# Patient Record
Sex: MALE | Race: WHITE | URBAN - METROPOLITAN AREA
[De-identification: names, ages, dates, MRNs, and addresses within clinical notes are randomized per-mention and may not be internally consistent; named-entity substitution may affect disease eponyms.]

---

## 2023-01-04 NOTE — PROGRESS NOTES
Jaclyn Martínez is a 27 y.o. male is seen on 1/5/2023 for Establish Care (Medications for ADD. )    Assessment & Plan:     1. Attention deficit disorder, unspecified hyperactivity presence  Assessment & Plan:  UDS, CSA done today  Understands that blood pressure and pulse rate will need to be within normal limits prior to restart of stimulant  Will await UDS results  Orders:  -     COMPLIANCE DRUG SCREEN/PRESCRIPTION MONITORING; Future  2. Encounter for therapeutic drug monitoring  -     COMPLIANCE DRUG SCREEN/PRESCRIPTION MONITORING; Future  3. Elevated blood pressure reading without diagnosis of hypertension  Assessment & Plan:  Likely secondary to current Prednisone use, re-evaluate in 4 weeks  4. Tachycardia  Comments:  Asymptomatic, likely d/t current Prednisone use, re-evaluate in 4 weeks  5. Elevated LFTs  Assessment & Plan:  New-onset, advised on ETOH/Tylenol use  Recheck LFTs with viral hepatitis panel in 3 weeks  Pursue liver US if worse  Orders:  -     HEPATIC FUNCTION PANEL; Future  -     HEPATITIS PANEL, ACUTE; Future  6. Needs flu shot  Comments: Will hold off until done with Prednisone  7. Encounter to establish care  Comments:  Non-fasting labs completed on 12/27/2022 reviewed and scanned into chart  Completed Annual Wellness Exam previously on 12/30/2022    Follow-up and Dispositions    Return in about 4 weeks (around 2/2/2023) for physical, lab results (hepatic function panel, viral hepatitis panel).        Subjective:     HPI    Previous PCP:  Whitley Dawn  Reason for switching: PCP retired    Social Hx:  Occupation- [employer]  Household- lives with wife and daughter (10 mos)  Alcohol Use- 2 times a month  Recreational Drug Use- none  Tobacco Use- none    Family Hx:  HTN- mother  Diabetes-  none  HLD- none  MI- none  Stroke- none  Cancer- none  Mental Health Disorder- mother (ADHD)  Autoimmune Disease - none    Preventive:  COVID-19 vac - due for booster  Flu vaccine- will hold off until off of steroids  Tetanus vac - received a little over 9 mos ago when his daughter was born  MyChart activation -    Medical History/Health Concerns:    ADHD -  Diagnosed in his early 25s  Was on Adderall XR 10 mg daily, last dose was 4-5 years ago  Adderall worked for him    Elevated BP -  No prior hx of HTN  Currently taking Prednisone for bronchitis  Admits that he is at his heaviest and wants to go back to the gym    Review of Systems   Constitutional:  Negative for chills, fever and malaise/fatigue. Respiratory:  Negative for shortness of breath. Cardiovascular:  Negative for chest pain. Objective:   BP (!) 144/108 (BP 1 Location: Left upper arm, BP Patient Position: Sitting, BP Cuff Size: Large adult)   Pulse (!) 116   Temp 97.7 °F (36.5 °C) (Oral)   Resp 17   Ht 6' 2\" (1.88 m)   Wt 286 lb (129.7 kg)   SpO2 97%   BMI 36.72 kg/m²     Physical Exam  Vitals and nursing note reviewed. Constitutional:       Appearance: Normal appearance. HENT:      Head: Normocephalic and atraumatic. Cardiovascular:      Rate and Rhythm: Regular rhythm. Tachycardia present. Heart sounds: No murmur heard. No gallop. Pulmonary:      Effort: Pulmonary effort is normal. No respiratory distress. Breath sounds: No wheezing, rhonchi or rales. Musculoskeletal:         General: Normal range of motion. Skin:     General: Skin is warm and dry. Neurological:      General: No focal deficit present. Mental Status: He is alert and oriented to person, place, and time. Psychiatric:         Mood and Affect: Mood normal.         Thought Content:  Thought content normal.         Judgment: Judgment normal.          Kanika Chopra NP

## 2023-01-05 ENCOUNTER — HOSPITAL ENCOUNTER (OUTPATIENT)
Dept: LAB | Age: 33
Discharge: HOME | End: 2023-01-05
Payer: COMMERCIAL

## 2023-01-05 ENCOUNTER — OFFICE VISIT (OUTPATIENT)
Dept: FAMILY MEDICINE CLINIC | Age: 33
End: 2023-01-05
Payer: COMMERCIAL

## 2023-01-05 VITALS
DIASTOLIC BLOOD PRESSURE: 108 MMHG | OXYGEN SATURATION: 97 % | BODY MASS INDEX: 36.7 KG/M2 | HEIGHT: 74 IN | RESPIRATION RATE: 17 BRPM | WEIGHT: 286 LBS | SYSTOLIC BLOOD PRESSURE: 144 MMHG | HEART RATE: 116 BPM | TEMPERATURE: 97.7 F

## 2023-01-05 DIAGNOSIS — R03.0 ELEVATED BLOOD PRESSURE READING WITHOUT DIAGNOSIS OF HYPERTENSION: ICD-10-CM

## 2023-01-05 DIAGNOSIS — F98.8 ATTENTION DEFICIT DISORDER, UNSPECIFIED HYPERACTIVITY PRESENCE: ICD-10-CM

## 2023-01-05 DIAGNOSIS — R00.0 TACHYCARDIA: ICD-10-CM

## 2023-01-05 DIAGNOSIS — Z76.89 ENCOUNTER TO ESTABLISH CARE: ICD-10-CM

## 2023-01-05 DIAGNOSIS — Z51.81 ENCOUNTER FOR THERAPEUTIC DRUG MONITORING: ICD-10-CM

## 2023-01-05 DIAGNOSIS — R79.89 ELEVATED LFTS: ICD-10-CM

## 2023-01-05 DIAGNOSIS — F98.8 ATTENTION DEFICIT DISORDER, UNSPECIFIED HYPERACTIVITY PRESENCE: Primary | ICD-10-CM

## 2023-01-05 DIAGNOSIS — Z23 NEEDS FLU SHOT: ICD-10-CM

## 2023-01-05 PROCEDURE — 80307 DRUG TEST PRSMV CHEM ANLYZR: CPT

## 2023-01-05 PROCEDURE — 99204 OFFICE O/P NEW MOD 45 MIN: CPT | Performed by: NURSE PRACTITIONER

## 2023-01-05 NOTE — PROGRESS NOTES
Dylan Velez presents today for   Chief Complaint   Patient presents with    Establish Care     Medications for ADD. Is someone accompanying this pt? no    Is the patient using any DME equipment during [address]? no    Depression Screening:  3 most recent PHQ Screens 1/5/2023   Little interest or pleasure in doing things Not at all   Feeling down, depressed, irritable, or hopeless Not at all   Total Score PHQ 2 0       Learning Assessment:  Learning Assessment 1/5/2023   PRIMARY LEARNER Patient   HIGHEST LEVEL OF EDUCATION - PRIMARY LEARNER  SOME COLLEGE   BARRIERS PRIMARY LEARNER NONE   CO-LEARNER CAREGIVER No   PRIMARY LANGUAGE ENGLISH    NEED -   LEARNER PREFERENCE PRIMARY DEMONSTRATION   ANSWERED BY patient   RELATIONSHIP SELF       Fall Risk  No flowsheet data found. ADL  ADL Assessment 8/4/2016   Feeding yourself No Help Needed   Getting from bed to chair No Help Needed   Getting dressed No Help Needed   Bathing or showering No Help Needed   Walk across the room (includes cane/walker) No Help Needed   Using the telphone No Help Needed   Taking your medications No Help Needed   Preparing meals No Help Needed   Managing money (expenses/bills) No Help Needed   Moderately strenuous housework (laundry) No Help Needed   Shopping for personal items (toiletries/medicines) No Help Needed   Shopping for groceries No Help Needed   Driving No Help Needed   Climbing a flight of stairs No Help Needed   Getting to places beyond walking distances No Help Needed       Travel Screening:    Travel Screening       Question Response    In the last 10 days, have you been in contact with someone who was confirmed or suspected to have Coronavirus/COVID-19? No / Unsure    Have you had a COVID-19 viral test in the last 10 days? No    Do you have any of the following new or worsening symptoms? None of these    Have you traveled internationally or domestically in the last month?  No          Travel History   Travel since 12/05/22    No documented travel since 12/05/22         Health Maintenance reviewed and discussed and ordered per Provider. Health Maintenance Due   Topic Date Due    Hepatitis C Screening  Never done    Depression Screen  Never done    DTaP/Tdap/Td series (6 - Tdap) 04/13/1999    COVID-19 Vaccine (3 - Booster for Pfizer series) 07/16/2021    Flu Vaccine (1) Never done   . Coordination of Care:  1. Have you been to the ER, urgent care clinic since your last visit? Hospitalized since your last visit? no    2. Have you seen or consulted any other health care providers outside of the [address] [place] [address] since your last visit? Include any pap smears or colon screening.  no

## 2023-01-05 NOTE — ASSESSMENT & PLAN NOTE
New-onset, advised on ETOH/Tylenol use  Recheck LFTs with viral hepatitis panel in 3 weeks  Pursue liver US if worse

## 2023-01-05 NOTE — ASSESSMENT & PLAN NOTE
UDS, CSA done today  Understands that blood pressure and pulse rate will need to be within normal limits prior to restart of stimulant  Will await UDS results

## 2023-01-11 LAB — DRUGS UR: NORMAL

## 2023-02-06 PROBLEM — Z00.00 ANNUAL PHYSICAL EXAM: Status: ACTIVE | Noted: 2023-02-06

## 2023-02-06 NOTE — PROGRESS NOTES
Angelina Barrera is a 27 y.o. male is seen on 2/8/2023 for Attention Deficit Disorder and Physical    Assessment & Plan:     1. Annual physical exam  Assessment & Plan:  Physical activity for a total of 150 minutes per week is recommended, drink plenty of water. Reduce CHO intake, such as white pastas, white rice, white breads. Avoid fried foods, and eat more green, leafy vegetables, whole grains, and lean proteins. Discussed recommended screenings and vaccines. Labs were already done previously (see media)    2. Essential hypertension  Assessment & Plan:  Remains uncontrolled, goal <130/80  Start Losartan 25 mg daily  Advised on home BP monitoring, given log to bring back to next appt  If BP remains elevated, increase Losartan to 50 mg   Orders:  -     losartan (COZAAR) 25 mg tablet; Take 1 Tablet by mouth daily. , Normal, Disp-90 Tablet, R-0  3. Attention deficit disorder, unspecified hyperactivity presence  Assessment & Plan:  Non a candidate for stimulant pharmacologic treatment, refer to psychiatry for further eval  Orders:  -     REFERRAL TO PSYCHIATRY  4. Tachycardia  Comments:  Check EKG, may consider checking TSH if persists  Orders:  -     EKG, 12 LEAD, INITIAL; Future  5. Needs flu shot  Comments:  Wants to hold off for next time  6. Morbid obesity ([facility] [address]  Assessment & Plan:  Comorbid:  HTN  Lifestyle modifications advised  7. BMI 36.0-36.9,adult  Comments:  Diet and exercise advised    Follow-up and Dispositions    Return in about 4 weeks (around 3/8/2023) for blood pressure, lab results.        Subjective:     HPI    Social Hx:  Occupation- [employer]  Household- lives with wife and daughter (10 mos)  Alcohol Use- 2 times a month  Recreational Drug Use- none  Tobacco Use- none    Family Hx:  HTN- mother  Diabetes-  none  HLD- none  MI- none  Stroke- none  Cancer- none  Mental Health Disorder- mother (ADHD)  Autoimmune Disease - none    Preventive:  Diet: cereal in the mornings, Hello Fresh for dinner  Exercise:  None  Last eye exam: last year  Last dental exam: 2 years ago  COVID-19 vac - due for booster  Flu vaccine- wants to get next time  Tetanus vac - received a little over 9 mos ago when his daughter was born    Medical History/Health Concerns:    ADHD -  Diagnosed in his early 25s  Was on Adderall XR 10 mg daily, last dose was 4-5 years ago  Adderall worked for him  Last UDS done on 01/05/2023, negative    Elevated BP -  No prior hx of HTN  Currently taking Prednisone for bronchitis  Admits that he is at his heaviest and wants to go back to the gym  Reports family hx of HTN in mother    Review of Systems   Constitutional:  Negative for chills, fever, malaise/fatigue and weight loss. HENT:  Negative for congestion, ear pain and sore throat. Eyes:  Negative for blurred vision, pain and discharge. Respiratory:  Negative for cough and shortness of breath. Cardiovascular:  Negative for chest pain, palpitations, orthopnea and leg swelling. Gastrointestinal:  Negative for abdominal pain, blood in stool, diarrhea, nausea and vomiting. Genitourinary:  Negative for dysuria, frequency, hematuria and urgency. Musculoskeletal:  Negative for back pain, joint pain and myalgias. Skin:  Negative for rash. Neurological:  Negative for dizziness, tingling, weakness and headaches. Endo/Heme/Allergies:  Does not bruise/bleed easily. Psychiatric/Behavioral:  Negative for depression. The patient is not nervous/anxious and does not have insomnia. Objective:   BP (!) 161/109 (BP 1 Location: Left upper arm, BP Patient Position: Sitting, BP Cuff Size: Adult)   Pulse (!) 115   Temp 98.3 °F (36.8 °C) (Oral)   Resp 16   Ht 6' 2\" (1.88 m)   Wt 282 lb (127.9 kg)   SpO2 99%   BMI 36.21 kg/m²     Physical Exam  Nursing note reviewed. Constitutional:       General: He is not in acute distress. Appearance: He is not ill-appearing. HENT:      Head: Normocephalic and atraumatic.       Right Ear: Tympanic membrane, ear canal and external ear normal.      Left Ear: Tympanic membrane, ear canal and external ear normal.      Nose: No congestion or rhinorrhea. Mouth/Throat:      Mouth: Mucous membranes are moist.      Pharynx: Oropharynx is clear. No oropharyngeal exudate or posterior oropharyngeal erythema. Eyes:      Extraocular Movements: Extraocular movements intact. Conjunctiva/sclera: Conjunctivae normal.      Pupils: Pupils are equal, round, and reactive to light. Neck:      Vascular: No carotid bruit. Cardiovascular:      Rate and Rhythm: Regular rhythm. Tachycardia present. Pulses: Normal pulses. Heart sounds: No murmur heard. No friction rub. No gallop. Pulmonary:      Effort: Pulmonary effort is normal. No respiratory distress. Breath sounds: No wheezing, rhonchi or rales. Abdominal:      General: Abdomen is flat. Bowel sounds are normal.      Palpations: Abdomen is soft. There is no mass. Tenderness: There is no abdominal tenderness. There is no guarding or rebound. Musculoskeletal:         General: No swelling, tenderness or deformity. Cervical back: Normal range of motion and neck supple. Right lower leg: No edema. Left lower leg: No edema. Lymphadenopathy:      Cervical: No cervical adenopathy. Skin:     General: Skin is warm and dry. Capillary Refill: Capillary refill takes less than 2 seconds. Neurological:      General: No focal deficit present. Mental Status: He is alert and oriented to person, place, and time. Sensory: No sensory deficit. Motor: No weakness. Psychiatric:         Mood and Affect: Mood normal.         Thought Content:  Thought content normal.         Judgment: Judgment normal.          Corinne Pitt NP

## 2023-02-06 NOTE — ASSESSMENT & PLAN NOTE
Physical activity for a total of 150 minutes per week is recommended, drink plenty of water. Reduce CHO intake, such as white pastas, white rice, white breads. Avoid fried foods, and eat more green, leafy vegetables, whole grains, and lean proteins. Discussed recommended screenings and vaccines.  Labs were already done previously (see media)

## 2023-02-08 ENCOUNTER — OFFICE VISIT (OUTPATIENT)
Dept: FAMILY MEDICINE CLINIC | Age: 33
End: 2023-02-08
Payer: COMMERCIAL

## 2023-02-08 VITALS
TEMPERATURE: 98.3 F | OXYGEN SATURATION: 99 % | WEIGHT: 282 LBS | RESPIRATION RATE: 16 BRPM | HEART RATE: 115 BPM | DIASTOLIC BLOOD PRESSURE: 109 MMHG | BODY MASS INDEX: 36.19 KG/M2 | SYSTOLIC BLOOD PRESSURE: 161 MMHG | HEIGHT: 74 IN

## 2023-02-08 DIAGNOSIS — R00.0 TACHYCARDIA: ICD-10-CM

## 2023-02-08 DIAGNOSIS — I10 ESSENTIAL HYPERTENSION: ICD-10-CM

## 2023-02-08 DIAGNOSIS — E66.01 MORBID OBESITY (HCC): ICD-10-CM

## 2023-02-08 DIAGNOSIS — F98.8 ATTENTION DEFICIT DISORDER, UNSPECIFIED HYPERACTIVITY PRESENCE: ICD-10-CM

## 2023-02-08 DIAGNOSIS — Z23 NEEDS FLU SHOT: ICD-10-CM

## 2023-02-08 DIAGNOSIS — Z00.00 ANNUAL PHYSICAL EXAM: Primary | ICD-10-CM

## 2023-02-08 PROBLEM — Z87.442 HISTORY OF KIDNEY STONES: Status: ACTIVE | Noted: 2023-02-08

## 2023-02-08 LAB
ALBUMIN SERPL-MCNC: 4.8 G/DL (ref 4–5)
ALP SERPL-CCNC: 113 IU/L (ref 44–121)
ALT SERPL-CCNC: 42 IU/L (ref 0–44)
AST SERPL-CCNC: 24 IU/L (ref 0–40)
BILIRUB DIRECT SERPL-MCNC: 0.13 MG/DL (ref 0–0.4)
BILIRUB SERPL-MCNC: 0.4 MG/DL (ref 0–1.2)
HAV IGM SERPL QL IA: NEGATIVE
HBV CORE IGM SERPL QL IA: NEGATIVE
HBV SURFACE AG SERPL QL IA: NORMAL
HCV AB S/CO SERPL IA: <0.1 S/CO RATIO (ref 0–0.9)
HCV AB SERPL QL IA: NORMAL
PROT SERPL-MCNC: 7.1 G/DL (ref 6–8.5)

## 2023-02-08 PROCEDURE — 99395 PREV VISIT EST AGE 18-39: CPT | Performed by: NURSE PRACTITIONER

## 2023-02-08 PROCEDURE — 3077F SYST BP >= 140 MM HG: CPT | Performed by: NURSE PRACTITIONER

## 2023-02-08 PROCEDURE — 99214 OFFICE O/P EST MOD 30 MIN: CPT | Performed by: NURSE PRACTITIONER

## 2023-02-08 PROCEDURE — 3080F DIAST BP >= 90 MM HG: CPT | Performed by: NURSE PRACTITIONER

## 2023-02-08 RX ORDER — LOSARTAN POTASSIUM 25 MG/1
25 TABLET ORAL DAILY
Qty: 90 TABLET | Refills: 0 | Status: SHIPPED | OUTPATIENT
Start: 2023-02-08

## 2023-02-08 RX ORDER — AMLODIPINE BESYLATE 5 MG/1
5 TABLET ORAL DAILY
Qty: 90 TABLET | Refills: 0 | Status: CANCELLED | OUTPATIENT
Start: 2023-02-08

## 2023-02-08 NOTE — PROGRESS NOTES
Shaq Knutson presents today for   Chief Complaint   Patient presents with    Attention Deficit Disorder    Physical       Is someone accompanying this pt? no    Is the patient using any DME equipment during OV? no    Depression Screening:  3 most recent PHQ Screens 2/8/2023   Little interest or pleasure in doing things Not at all   Feeling down, depressed, irritable, or hopeless Not at all   Total Score PHQ 2 0       Learning Assessment:  Learning Assessment 1/5/2023   PRIMARY LEARNER Patient   HIGHEST LEVEL OF EDUCATION - PRIMARY LEARNER  SOME COLLEGE   BARRIERS PRIMARY LEARNER NONE   CO-LEARNER CAREGIVER No   PRIMARY LANGUAGE ENGLISH    NEED -   LEARNER PREFERENCE PRIMARY DEMONSTRATION   ANSWERED BY patient   RELATIONSHIP SELF       Fall Risk  No flowsheet data found. ADL  ADL Assessment 8/4/2016   Feeding yourself No Help Needed   Getting from bed to chair No Help Needed   Getting dressed No Help Needed   Bathing or showering No Help Needed   Walk across the room (includes cane/walker) No Help Needed   Using the telphone No Help Needed   Taking your medications No Help Needed   Preparing meals No Help Needed   Managing money (expenses/bills) No Help Needed   Moderately strenuous housework (laundry) No Help Needed   Shopping for personal items (toiletries/medicines) No Help Needed   Shopping for groceries No Help Needed   Driving No Help Needed   Climbing a flight of stairs No Help Needed   Getting to places beyond walking distances No Help Needed       Travel Screening:    Travel Screening       Question Response    In the last 10 days, have you been in contact with someone who was confirmed or suspected to have Coronavirus/COVID-19? No / Unsure    Have you had a COVID-19 viral test in the last 10 days? No    Do you have any of the following new or worsening symptoms? None of these    Have you traveled internationally or domestically in the last month?  No          Travel History   Travel since 01/08/23    No documented travel since 01/08/23         Health Maintenance reviewed and discussed and ordered per Provider. Health Maintenance Due   Topic Date Due    Hepatitis C Screening  Never done    DTaP/Tdap/Td series (6 - Tdap) 04/13/1999    COVID-19 Vaccine (3 - Booster for Pfizer series) 07/16/2021    Flu Vaccine (1) Never done   . Coordination of Care:  1. Have you been to the ER, urgent care clinic since your last visit? Hospitalized since your last visit? no    2. Have you seen or consulted any other health care providers outside of the [address] [place] [address] since your last visit? Include any pap smears or colon screening.  no

## 2023-02-08 NOTE — PATIENT INSTRUCTIONS
Well Visit, Ages 25 to 72: Care Instructions  Well visits can help you stay healthy. Your doctor has checked your overall health and may have suggested ways to take good care of yourself. Your doctor also may have recommended tests. You can help prevent illness with healthy eating, good sleep, vaccinations, regular exercise, and other steps. Get the tests that you and your doctor decide on. Depending on your age and risks, examples might include screening for diabetes; hepatitis C; HIV; and cervical, breast, lung, and colon cancer. Screening helps find diseases before any symptoms appear. Eat healthy foods. Choose fruits, vegetables, whole grains, lean protein, and low-fat dairy foods. Limit saturated fat and reduce salt. Limit alcohol. Men should have no more than 2 drinks a day. Women should have no more than 1. For some people, no alcohol is the best choice. Exercise. Get at least 30 minutes of exercise on most days of the week. Walking can be a good choice. Reach and stay at your healthy weight. This will lower your risk for many health problems. Take care of your mental health. Try to stay connected with friends, family, and community, and find ways to manage stress. If you're feeling depressed or hopeless, talk to someone. A counselor can help. If you don't have a counselor, talk to your doctor. Talk to your doctor if you think you may have a problem with alcohol or drug use. This includes prescription medicines and illegal drugs. Avoid tobacco and nicotine: Don't smoke, vape, or chew. If you need help quitting, talk to your doctor. Practice safer sex. Getting tested, using condoms or dental dams, and limiting sex partners can help prevent STIs. Use birth control if it's important to you to prevent pregnancy. Talk with your doctor about your choices and what might be best for you. Prevent problems where you can.  Protect your skin from too much sun, wash your hands, brush your teeth twice a day, and wear a seat belt in the car. Where can you learn more? Go to http://www.[employer].com/  Enter P072 in the search box to learn more about \"Well Visit, Ages 25 to 72: Care Instructions. \"  Current as of: March 9, 2022               Content Version: 13.4  © 8689-8161 [employer]. Care instructions adapted under license by [employer] (which disclaims liability or warranty for this information). If you have questions about a medical condition or this instruction, always ask your healthcare professional. [place] [address] any warranty or liability for your use of this information.

## 2023-02-08 NOTE — ASSESSMENT & PLAN NOTE
Remains uncontrolled, goal <130/80  Start Losartan 25 mg daily  Advised on home BP monitoring, given log to bring back to next appt  If BP remains elevated, increase Losartan to 50 mg

## 2023-03-08 ENCOUNTER — TELEPHONE (OUTPATIENT)
Age: 33
End: 2023-03-08

## 2023-03-08 PROBLEM — Z00.00 ANNUAL PHYSICAL EXAM: Status: RESOLVED | Noted: 2023-02-06 | Resolved: 2023-03-08

## 2023-03-08 NOTE — TELEPHONE ENCOUNTER
Patient was supposed to schedule a 4-week follow up for HTN and lab results. Please call patient back and schedule. Bring blood pressure monitor and log to next appointment. Thank you.

## 2023-05-18 PROBLEM — R73.01 IFG (IMPAIRED FASTING GLUCOSE): Status: ACTIVE | Noted: 2023-05-18

## 2023-05-18 PROBLEM — E78.5 DYSLIPIDEMIA, GOAL LDL BELOW 100: Status: ACTIVE | Noted: 2023-05-18

## 2023-05-18 PROBLEM — R79.89 ELEVATED LFTS: Status: RESOLVED | Noted: 2023-01-05 | Resolved: 2023-05-18

## 2023-05-18 RX ORDER — LOSARTAN POTASSIUM 25 MG/1
TABLET ORAL
Qty: 90 TABLET | OUTPATIENT
Start: 2023-05-18

## 2023-05-18 ASSESSMENT — ENCOUNTER SYMPTOMS: SHORTNESS OF BREATH: 0

## 2023-05-18 NOTE — PROGRESS NOTES
Chief Complaint   Patient presents with    Hypertension    Cholesterol Problem    Other     Impaired fasting glucose      Assessment & Plan:     1. Essential hypertension  Assessment & Plan:  BP acceptable, goal <130/80, continue regimen  Orders:  -     Comprehensive Metabolic Panel; Future  -     losartan (COZAAR) 25 MG tablet; Take 1 tablet by mouth daily, Disp-90 tablet, R-0Normal  2. Dyslipidemia, goal LDL below 100  Assessment & Plan:  Lifestyle modifications advised  Recheck lipid panel in 3 mos  Orders:  -     Comprehensive Metabolic Panel; Future  -     Lipid Panel; Future  3. Hyperglycemia  Assessment & Plan:  Add A1c to next set of labs in 3 mos  Orders:  -     Comprehensive Metabolic Panel; Future  -     Hemoglobin A1C; Future    Follow-up and Dispositions    Return in about 3 months (around 8/19/2023) for blood pressure, cholesterol, elevated fasting blood sugar, lab results. Subjective:     HPI    Hypertension-  Symptoms:  None  BP readings at home are 672O systolic  Comorbid: morbid obesity  Current treatment: Losartan 25 mg started in February  Failed to follow up in March  Has not completed fasting labs    Hyperlipidemia  Treatment:  None  Comorbid:    Diet: does Hello Fresh  Exercise: Has not had time     Hyperglycemia-  States his labs from December 2022 was not fasting  Glucose at the time ws 142  Risk factors: HTN, HLD  A1c was 5.6 in December 2022 (see media)    Health Maintenance:  COVID-19 vac - due for booster  Tetanus vac - received a little over 9 mos ago when his daughter was born  A1c - ordered    Review of Systems   Constitutional: Negative. Respiratory:  Negative for shortness of breath. Cardiovascular:  Negative for chest pain, palpitations and leg swelling. Neurological:  Negative for dizziness and headaches.      Objective:   /87 (Site: Left Upper Arm, Position: Sitting, Cuff Size: Medium Adult)   Pulse (!) 117   Temp 98.1 °F (36.7 °C) (Oral)   Resp 16   Ht 6'

## 2023-05-19 ENCOUNTER — OFFICE VISIT (OUTPATIENT)
Facility: CLINIC | Age: 33
End: 2023-05-19
Payer: COMMERCIAL

## 2023-05-19 VITALS
OXYGEN SATURATION: 98 % | HEART RATE: 117 BPM | BODY MASS INDEX: 37.22 KG/M2 | RESPIRATION RATE: 16 BRPM | SYSTOLIC BLOOD PRESSURE: 126 MMHG | HEIGHT: 74 IN | TEMPERATURE: 98.1 F | WEIGHT: 290 LBS | DIASTOLIC BLOOD PRESSURE: 87 MMHG

## 2023-05-19 DIAGNOSIS — I10 ESSENTIAL HYPERTENSION: Primary | ICD-10-CM

## 2023-05-19 DIAGNOSIS — R73.9 HYPERGLYCEMIA: ICD-10-CM

## 2023-05-19 DIAGNOSIS — E78.5 DYSLIPIDEMIA, GOAL LDL BELOW 100: ICD-10-CM

## 2023-05-19 PROCEDURE — 99214 OFFICE O/P EST MOD 30 MIN: CPT | Performed by: NURSE PRACTITIONER

## 2023-05-19 PROCEDURE — 3079F DIAST BP 80-89 MM HG: CPT | Performed by: NURSE PRACTITIONER

## 2023-05-19 PROCEDURE — 3074F SYST BP LT 130 MM HG: CPT | Performed by: NURSE PRACTITIONER

## 2023-05-19 RX ORDER — GUANFACINE 2 MG/1
TABLET, EXTENDED RELEASE ORAL
COMMUNITY
Start: 2023-05-11

## 2023-05-19 RX ORDER — LOSARTAN POTASSIUM 25 MG/1
25 TABLET ORAL DAILY
COMMUNITY
End: 2023-05-19 | Stop reason: SDUPTHER

## 2023-05-19 RX ORDER — LOSARTAN POTASSIUM 25 MG/1
25 TABLET ORAL DAILY
Qty: 90 TABLET | Refills: 0 | Status: SHIPPED | OUTPATIENT
Start: 2023-05-19

## 2023-05-19 SDOH — ECONOMIC STABILITY: HOUSING INSECURITY
IN THE LAST 12 MONTHS, WAS THERE A TIME WHEN YOU DID NOT HAVE A STEADY PLACE TO SLEEP OR SLEPT IN A SHELTER (INCLUDING NOW)?: NO

## 2023-05-19 SDOH — ECONOMIC STABILITY: FOOD INSECURITY: WITHIN THE PAST 12 MONTHS, THE FOOD YOU BOUGHT JUST DIDN'T LAST AND YOU DIDN'T HAVE MONEY TO GET MORE.: NEVER TRUE

## 2023-05-19 SDOH — ECONOMIC STABILITY: TRANSPORTATION INSECURITY
IN THE PAST 12 MONTHS, HAS LACK OF TRANSPORTATION KEPT YOU FROM MEETINGS, WORK, OR FROM GETTING THINGS NEEDED FOR DAILY LIVING?: NO

## 2023-05-19 SDOH — ECONOMIC STABILITY: INCOME INSECURITY: HOW HARD IS IT FOR YOU TO PAY FOR THE VERY BASICS LIKE FOOD, HOUSING, MEDICAL CARE, AND HEATING?: SOMEWHAT HARD

## 2023-05-19 SDOH — ECONOMIC STABILITY: FOOD INSECURITY: WITHIN THE PAST 12 MONTHS, YOU WORRIED THAT YOUR FOOD WOULD RUN OUT BEFORE YOU GOT MONEY TO BUY MORE.: NEVER TRUE

## 2023-05-19 ASSESSMENT — PATIENT HEALTH QUESTIONNAIRE - PHQ9
SUM OF ALL RESPONSES TO PHQ QUESTIONS 1-9: 0
1. LITTLE INTEREST OR PLEASURE IN DOING THINGS: 0
2. FEELING DOWN, DEPRESSED OR HOPELESS: 0
SUM OF ALL RESPONSES TO PHQ QUESTIONS 1-9: 0
SUM OF ALL RESPONSES TO PHQ9 QUESTIONS 1 & 2: 0

## 2023-08-14 DIAGNOSIS — I10 ESSENTIAL HYPERTENSION: ICD-10-CM

## 2023-08-15 RX ORDER — LOSARTAN POTASSIUM 25 MG/1
25 TABLET ORAL DAILY
Qty: 30 TABLET | Refills: 0 | Status: SHIPPED | OUTPATIENT
Start: 2023-08-15

## 2023-08-15 NOTE — TELEPHONE ENCOUNTER
Refill approved for 30 days. Please advise patient to complete fasting labs asap, prior to scheduled appointment on 08/18/2023. Thanks.

## 2023-09-12 DIAGNOSIS — I10 ESSENTIAL HYPERTENSION: ICD-10-CM

## 2023-09-12 RX ORDER — LOSARTAN POTASSIUM 25 MG/1
25 TABLET ORAL DAILY
Qty: 30 TABLET | Refills: 0 | Status: SHIPPED | OUTPATIENT
Start: 2023-09-12

## 2023-09-12 RX ORDER — LOSARTAN POTASSIUM 25 MG/1
25 TABLET ORAL DAILY
Qty: 30 TABLET | Refills: 0 | OUTPATIENT
Start: 2023-09-12

## 2023-09-12 NOTE — TELEPHONE ENCOUNTER
Spoke w/ pt in ref to labs still needing to be drawn and f/u. Pt became upset stating he cannot afford to keep making appt and having these labs drawn. I did inform pt he was given 30days worth of Losartan on 8/15/23 to hold him off to have labs. Pt declined to do anything at this time. Pt informed that SYDNEE Grover will be notified.

## 2023-09-12 NOTE — TELEPHONE ENCOUNTER
Spoke w/ pt and informed him NP Wassertrüdingen approve for 30 days refill to give him time on having labs drawn.

## 2023-09-12 NOTE — TELEPHONE ENCOUNTER
Given another 30-days supply, will need labs and follow up as previously planned. Please advise patient. Thank you.

## 2024-11-20 NOTE — PROGRESS NOTES
of motion.      Cervical back: Normal range of motion and neck supple.   Lymphadenopathy:      Cervical: No cervical adenopathy.   Skin:     General: Skin is warm and dry.   Neurological:      General: No focal deficit present.      Mental Status: He is alert and oriented to person, place, and time.   Psychiatric:         Mood and Affect: Mood normal.         Thought Content: Thought content normal.         Judgment: Judgment normal.         MIC Rivas

## 2024-11-21 ENCOUNTER — OFFICE VISIT (OUTPATIENT)
Facility: CLINIC | Age: 34
End: 2024-11-21

## 2024-11-21 VITALS
WEIGHT: 304 LBS | BODY MASS INDEX: 39.01 KG/M2 | TEMPERATURE: 97.9 F | SYSTOLIC BLOOD PRESSURE: 152 MMHG | OXYGEN SATURATION: 97 % | HEART RATE: 119 BPM | HEIGHT: 74 IN | RESPIRATION RATE: 14 BRPM | DIASTOLIC BLOOD PRESSURE: 96 MMHG

## 2024-11-21 DIAGNOSIS — Z00.00 ANNUAL PHYSICAL EXAM: Primary | ICD-10-CM

## 2024-11-21 DIAGNOSIS — E66.01 MORBID OBESITY: ICD-10-CM

## 2024-11-21 DIAGNOSIS — E78.5 DYSLIPIDEMIA, GOAL LDL BELOW 100: ICD-10-CM

## 2024-11-21 DIAGNOSIS — R73.9 HYPERGLYCEMIA: ICD-10-CM

## 2024-11-21 DIAGNOSIS — Z23 NEED FOR PROPHYLACTIC VACCINATION AGAINST DIPHTHERIA-TETANUS-PERTUSSIS (DTP): ICD-10-CM

## 2024-11-21 DIAGNOSIS — Z28.21 INFLUENZA VACCINATION DECLINED: ICD-10-CM

## 2024-11-21 DIAGNOSIS — I10 UNCONTROLLED HYPERTENSION: ICD-10-CM

## 2024-11-21 DIAGNOSIS — R00.0 TACHYCARDIA: ICD-10-CM

## 2024-11-21 RX ORDER — LOSARTAN POTASSIUM 25 MG/1
25 TABLET ORAL DAILY
Qty: 30 TABLET | Refills: 0 | Status: SHIPPED | OUTPATIENT
Start: 2024-11-21

## 2024-11-21 RX ORDER — CHLORTHALIDONE 25 MG/1
25 TABLET ORAL DAILY
Qty: 30 TABLET | Refills: 0 | Status: SHIPPED | OUTPATIENT
Start: 2024-11-21

## 2024-11-21 ASSESSMENT — ENCOUNTER SYMPTOMS
CHEST TIGHTNESS: 0
CONSTIPATION: 0
ABDOMINAL PAIN: 0
SHORTNESS OF BREATH: 0
ABDOMINAL DISTENTION: 0
BLOOD IN STOOL: 0
VOMITING: 0
COUGH: 0
DIARRHEA: 0
NAUSEA: 0

## 2024-11-21 ASSESSMENT — VISUAL ACUITY
OS_CC: 20/25
OD_CC: 20/25

## 2024-11-21 NOTE — ASSESSMENT & PLAN NOTE
BP elevated, asymptomatic, goal <130/80  Has not been seen since May 2023, emphasized importance of timely follow-up  Restart Losartan 25 mg daily and add chlorthalidone 25 mg daily  Complete fasting labs one week after starting diuretics  Home BP monitoring advised, bring log to next appt  Re-evaluate in 4 weeks and if remains elevated, increase Losartan to 50 mg daily  Discussed s/s of stroke (F.A.S.T.), he is to go to the ER if he experiences any of these symptoms  Check EKG, given tachycardia

## 2024-12-03 ENCOUNTER — HOSPITAL ENCOUNTER (OUTPATIENT)
Facility: HOSPITAL | Age: 34
Discharge: HOME | End: 2024-12-06
Payer: COMMERCIAL

## 2024-12-03 DIAGNOSIS — R00.0 TACHYCARDIA: ICD-10-CM

## 2024-12-03 DIAGNOSIS — R73.9 HYPERGLYCEMIA: ICD-10-CM

## 2024-12-03 DIAGNOSIS — I10 UNCONTROLLED HYPERTENSION: ICD-10-CM

## 2024-12-03 DIAGNOSIS — E78.5 DYSLIPIDEMIA, GOAL LDL BELOW 100: ICD-10-CM

## 2024-12-03 LAB
ALBUMIN SERPL-MCNC: 4.5 G/DL (ref 3.4–5)
ALBUMIN/GLOB SERPL: 1.2 (ref 0.8–1.7)
ALP SERPL-CCNC: 94 U/L (ref 45–117)
ALT SERPL-CCNC: 64 U/L (ref 16–61)
ANION GAP SERPL CALC-SCNC: 7 MMOL/L (ref 3–18)
AST SERPL-CCNC: 27 U/L (ref 10–38)
BASOPHILS # BLD: 0.1 K/UL (ref 0–0.1)
BASOPHILS NFR BLD: 1 % (ref 0–2)
BILIRUB SERPL-MCNC: 0.9 MG/DL (ref 0.2–1)
BUN SERPL-MCNC: 15 MG/DL (ref 7–18)
BUN/CREAT SERPL: 13 (ref 12–20)
CALCIUM SERPL-MCNC: 9.9 MG/DL (ref 8.5–10.1)
CHLORIDE SERPL-SCNC: 100 MMOL/L (ref 100–111)
CHOLEST SERPL-MCNC: 141 MG/DL
CO2 SERPL-SCNC: 28 MMOL/L (ref 21–32)
CREAT SERPL-MCNC: 1.15 MG/DL (ref 0.6–1.3)
DIFFERENTIAL METHOD BLD: ABNORMAL
EKG ATRIAL RATE: 112 BPM
EKG DIAGNOSIS: NORMAL
EKG P AXIS: 43 DEGREES
EKG P-R INTERVAL: 154 MS
EKG Q-T INTERVAL: 334 MS
EKG QRS DURATION: 88 MS
EKG QTC CALCULATION (BAZETT): 455 MS
EKG R AXIS: 52 DEGREES
EKG T AXIS: 38 DEGREES
EKG VENTRICULAR RATE: 112 BPM
EOSINOPHIL # BLD: 0.5 K/UL (ref 0–0.4)
EOSINOPHIL NFR BLD: 5 % (ref 0–5)
ERYTHROCYTE [DISTWIDTH] IN BLOOD BY AUTOMATED COUNT: 12.9 % (ref 11.6–14.5)
EST. AVERAGE GLUCOSE BLD GHB EST-MCNC: 117 MG/DL
GLOBULIN SER CALC-MCNC: 3.7 G/DL (ref 2–4)
GLUCOSE SERPL-MCNC: 90 MG/DL (ref 74–99)
HBA1C MFR BLD: 5.7 % (ref 4.2–5.6)
HCT VFR BLD AUTO: 52 % (ref 36–48)
HDLC SERPL-MCNC: 43 MG/DL (ref 40–60)
HDLC SERPL: 3.3 (ref 0–5)
HGB BLD-MCNC: 16.7 G/DL (ref 13–16)
IMM GRANULOCYTES # BLD AUTO: 0.1 K/UL (ref 0–0.04)
IMM GRANULOCYTES NFR BLD AUTO: 1 % (ref 0–0.5)
LDLC SERPL CALC-MCNC: 64.6 MG/DL (ref 0–100)
LIPID PANEL: ABNORMAL
LYMPHOCYTES # BLD: 2.8 K/UL (ref 0.9–3.6)
LYMPHOCYTES NFR BLD: 25 % (ref 21–52)
MCH RBC QN AUTO: 27.9 PG (ref 24–34)
MCHC RBC AUTO-ENTMCNC: 32.1 G/DL (ref 31–37)
MCV RBC AUTO: 86.8 FL (ref 78–100)
MONOCYTES # BLD: 0.8 K/UL (ref 0.05–1.2)
MONOCYTES NFR BLD: 7 % (ref 3–10)
NEUTS SEG # BLD: 6.9 K/UL (ref 1.8–8)
NEUTS SEG NFR BLD: 62 % (ref 40–73)
NRBC # BLD: 0 K/UL (ref 0–0.01)
NRBC BLD-RTO: 0 PER 100 WBC
PLATELET # BLD AUTO: 270 K/UL (ref 135–420)
PMV BLD AUTO: 11.3 FL (ref 9.2–11.8)
POTASSIUM SERPL-SCNC: 3.8 MMOL/L (ref 3.5–5.5)
PROT SERPL-MCNC: 8.2 G/DL (ref 6.4–8.2)
RBC # BLD AUTO: 5.99 M/UL (ref 4.35–5.65)
SODIUM SERPL-SCNC: 135 MMOL/L (ref 136–145)
TRIGL SERPL-MCNC: 167 MG/DL
TSH SERPL DL<=0.05 MIU/L-ACNC: 2.3 UIU/ML (ref 0.36–3.74)
VLDLC SERPL CALC-MCNC: 33.4 MG/DL
WBC # BLD AUTO: 11.1 K/UL (ref 4.6–13.2)

## 2024-12-03 PROCEDURE — 93005 ELECTROCARDIOGRAM TRACING: CPT

## 2024-12-03 PROCEDURE — 84443 ASSAY THYROID STIM HORMONE: CPT

## 2024-12-03 PROCEDURE — 80061 LIPID PANEL: CPT

## 2024-12-03 PROCEDURE — 93010 ELECTROCARDIOGRAM REPORT: CPT | Performed by: INTERNAL MEDICINE

## 2024-12-03 PROCEDURE — 85025 COMPLETE CBC W/AUTO DIFF WBC: CPT

## 2024-12-03 PROCEDURE — 83036 HEMOGLOBIN GLYCOSYLATED A1C: CPT

## 2024-12-03 PROCEDURE — 36415 COLL VENOUS BLD VENIPUNCTURE: CPT

## 2024-12-03 PROCEDURE — 80053 COMPREHEN METABOLIC PANEL: CPT

## 2024-12-11 ENCOUNTER — TELEPHONE (OUTPATIENT)
Facility: CLINIC | Age: 34
End: 2024-12-11

## 2024-12-11 DIAGNOSIS — I10 UNCONTROLLED HYPERTENSION: ICD-10-CM

## 2024-12-11 RX ORDER — LOSARTAN POTASSIUM 25 MG/1
25 TABLET ORAL DAILY
Qty: 30 TABLET | Refills: 0 | Status: SHIPPED | OUTPATIENT
Start: 2024-12-11

## 2024-12-11 RX ORDER — CHLORTHALIDONE 25 MG/1
25 TABLET ORAL DAILY
Qty: 30 TABLET | Refills: 0 | Status: SHIPPED | OUTPATIENT
Start: 2024-12-11

## 2024-12-11 NOTE — TELEPHONE ENCOUNTER
Upon rescheduling patients appointment on 12/19. He requested to have a refill for (losartan (COZAAR) 25 MG tablet) sent into the pharmacy since he would be seen before his medication ran out.    He also wanted to know if he should continue taking the (chlorthalidone (HYGROTON) 25 MG tablet ) and if so he would need that medication sent into the pharmacy as well.

## 2024-12-11 NOTE — TELEPHONE ENCOUNTER
Patient reschedule his appointment from 12/19/24 to 01/15/24. Since his appointment has been rescheduled for another month do you want to refill the Chlorthalidone 25mg?

## 2024-12-18 DIAGNOSIS — I10 UNCONTROLLED HYPERTENSION: ICD-10-CM

## 2024-12-18 RX ORDER — CHLORTHALIDONE 25 MG/1
25 TABLET ORAL DAILY
Qty: 90 TABLET | Refills: 0 | Status: SHIPPED | OUTPATIENT
Start: 2024-12-18

## 2024-12-18 RX ORDER — LOSARTAN POTASSIUM 25 MG/1
25 TABLET ORAL DAILY
Qty: 90 TABLET | Refills: 0 | Status: SHIPPED | OUTPATIENT
Start: 2024-12-18

## 2024-12-18 NOTE — TELEPHONE ENCOUNTER
Labs:   Lab Results   Component Value Date     (L) 12/03/2024    K 3.8 12/03/2024     12/03/2024    CO2 28 12/03/2024    BUN 15 12/03/2024    CREATININE 1.15 12/03/2024    GLUCOSE 90 12/03/2024    CALCIUM 9.9 12/03/2024    BILITOT 0.9 12/03/2024    ALKPHOS 94 12/03/2024    AST 27 12/03/2024    ALT 64 (H) 12/03/2024    LABGLOM 85 12/03/2024    GLOB 3.7 12/03/2024        Additional Notes:

## 2024-12-21 PROBLEM — Z00.00 ANNUAL PHYSICAL EXAM: Status: RESOLVED | Noted: 2023-02-06 | Resolved: 2024-12-21

## 2025-01-19 PROBLEM — R73.03 PREDIABETES: Status: ACTIVE | Noted: 2023-05-18

## 2025-01-19 PROBLEM — Z87.442 HISTORY OF KIDNEY STONES: Status: RESOLVED | Noted: 2023-02-08 | Resolved: 2025-01-19

## 2025-01-19 PROBLEM — D75.1 POLYCYTHEMIA: Status: ACTIVE | Noted: 2025-01-19

## 2025-01-19 NOTE — ASSESSMENT & PLAN NOTE
LDL 64, at goal  Triglycerides elevated at 167, advised increased risk for diabetes  Recheck lipids in 6 mos

## 2025-01-19 NOTE — PROGRESS NOTES
Arturo Blakely 34 y.o. male who was seen by synchronous (real-time) audio-video technology on 1/22/25 for   Chief Complaint   Patient presents with    Cholesterol Problem    Hypertension    Obesity    Discuss Labs    Other     Pre-diabetes     Assessment & Plan:     1. Uncontrolled hypertension  Assessment & Plan:  Home reading today 130/90, remains elevated, goal <130/80  Increase Losartan to 50 mg, continue chlorthalidone 25 mg  Continue home BP monitoring, bring monitor to next appointment  Re-evaluate in 4 weeks and if remains elevated, increase Losartan to 100 mg daily  Orders:  -     Comprehensive Metabolic Panel; Future  -     Lipid Panel; Future  -     losartan (COZAAR) 50 MG tablet; Take 1 tablet by mouth daily, Disp-90 tablet, R-0Please discontinue 25 mg doseNormal  2. Prediabetes  Assessment & Plan:  New-onset, A1c 5.7  Lifestyle modifications advised  Recheck A1c in 6 mos  Orders:  -     Comprehensive Metabolic Panel; Future  -     Hemoglobin A1C; Future  3. Polycythemia  Assessment & Plan:  New-onset risk factors include morbid obesity  Advised increased risk for blood clots  Denies any leg swelling, chest pains or shortness of breath  Trend for now and if persists/worsens, consider heme/onc consult  Repeat CBC in 3 mos  Orders:  -     CBC with Auto Differential; Future  4. Dyslipidemia, goal LDL below 100  Assessment & Plan:  LDL 64, at goal  Triglycerides elevated at 167, advised increased risk for diabetes  Recheck lipids in 6 mos  Orders:  -     Comprehensive Metabolic Panel; Future  -     Lipid Panel; Future  5. Elevated LFTs  Assessment & Plan:  Recurrent, ALT now 64  Lifestyle modifications advised  Recommend weight loss, increase physical activity  Advised on ETOH/Tylenol use  Recheck cmp in 3 mos and if remains elevated, pursue liver US  6. Morbid obesity  Assessment & Plan:  Lifestyle modifications advised  7. BMI 39.0-39.9,adult  Comments:  Dietary modifications advised  Increase physical

## 2025-01-19 NOTE — ASSESSMENT & PLAN NOTE
New-onset risk factors include morbid obesity  Advised increased risk for blood clots  Denies any leg swelling, chest pains or shortness of breath  Trend for now and if persists/worsens, consider heme/onc consult  Repeat CBC in 3 mos

## 2025-01-22 ENCOUNTER — TELEMEDICINE (OUTPATIENT)
Facility: CLINIC | Age: 35
End: 2025-01-22
Payer: COMMERCIAL

## 2025-01-22 DIAGNOSIS — E66.01 MORBID OBESITY: ICD-10-CM

## 2025-01-22 DIAGNOSIS — R73.03 PREDIABETES: ICD-10-CM

## 2025-01-22 DIAGNOSIS — E78.5 DYSLIPIDEMIA, GOAL LDL BELOW 100: ICD-10-CM

## 2025-01-22 DIAGNOSIS — I10 UNCONTROLLED HYPERTENSION: Primary | ICD-10-CM

## 2025-01-22 DIAGNOSIS — R79.89 ELEVATED LFTS: ICD-10-CM

## 2025-01-22 DIAGNOSIS — D75.1 POLYCYTHEMIA: ICD-10-CM

## 2025-01-22 DIAGNOSIS — Z71.2 ENCOUNTER TO DISCUSS TEST RESULTS: ICD-10-CM

## 2025-01-22 PROCEDURE — 98007 SYNCH AUDIO-VIDEO EST HI 40: CPT | Performed by: NURSE PRACTITIONER

## 2025-01-22 RX ORDER — METHYLPHENIDATE HYDROCHLORIDE 10 MG/1
10 TABLET ORAL 2 TIMES DAILY
COMMUNITY

## 2025-01-22 RX ORDER — LOSARTAN POTASSIUM 50 MG/1
50 TABLET ORAL DAILY
Qty: 90 TABLET | Refills: 0 | Status: SHIPPED | OUTPATIENT
Start: 2025-01-22

## 2025-01-22 SDOH — ECONOMIC STABILITY: FOOD INSECURITY: WITHIN THE PAST 12 MONTHS, YOU WORRIED THAT YOUR FOOD WOULD RUN OUT BEFORE YOU GOT MONEY TO BUY MORE.: NEVER TRUE

## 2025-01-22 SDOH — ECONOMIC STABILITY: FOOD INSECURITY: WITHIN THE PAST 12 MONTHS, THE FOOD YOU BOUGHT JUST DIDN'T LAST AND YOU DIDN'T HAVE MONEY TO GET MORE.: NEVER TRUE

## 2025-01-22 ASSESSMENT — PATIENT HEALTH QUESTIONNAIRE - PHQ9
SUM OF ALL RESPONSES TO PHQ QUESTIONS 1-9: 0
SUM OF ALL RESPONSES TO PHQ9 QUESTIONS 1 & 2: 0
2. FEELING DOWN, DEPRESSED OR HOPELESS: NOT AT ALL
SUM OF ALL RESPONSES TO PHQ QUESTIONS 1-9: 0
1. LITTLE INTEREST OR PLEASURE IN DOING THINGS: NOT AT ALL

## 2025-01-22 NOTE — ASSESSMENT & PLAN NOTE
Recurrent, ALT now 64  Lifestyle modifications advised  Recommend weight loss, increase physical activity  Advised on ETOH/Tylenol use  Recheck cmp in 3 mos and if remains elevated, pursue liver US

## 2025-01-22 NOTE — ASSESSMENT & PLAN NOTE
Home reading today 130/90, remains elevated, goal <130/80  Increase Losartan to 50 mg, continue chlorthalidone 25 mg  Continue home BP monitoring, bring monitor to next appointment  Re-evaluate in 4 weeks and if remains elevated, increase Losartan to 100 mg daily

## 2025-01-22 NOTE — PROGRESS NOTES
Arturo Blakely presents today for   Chief Complaint   Patient presents with    Cholesterol Problem    Hypertension    Obesity    Discuss Labs    Other     Pre-diabetes         Is the patient in the [facility] ? Yes     Is someone accompanying this pt? no    Is the patient using any DME equipment during OV? no    Depression Screenin/22/2025    10:21 AM 2023     9:40 AM 2023     2:10 PM 2023     2:09 PM 2023     2:38 PM   PHQ-9 Questionaire   Little interest or pleasure in doing things 0 0 0 0 0   Feeling down, depressed, or hopeless 0 0 0 0 0   PHQ-9 Total Score 0 0 0 0 0        EMILEE 7-Anxiety        No data to display                   Learning Assessment:  Who is the primary learner? Patient    What is the preferred language for health care of the primary learner? ENGLISH    How does the primary learner prefer to learn new concepts? DEMONSTRATION    Answered By patient    Relationship to Learner SELF    Highest level of education completed by primary learner? SOME COLLEGE         Fall Risk       No data to display                   Travel Screening:    Travel Screening     No screening recorded since 25 0000       Travel History   Travel since 24    No documented travel since 24          Health Maintenance reviewed and discussed and ordered per Provider.  Transportation Needs: No Transportation Needs (2025)    PRAPARE - Transportation     Lack of Transportation (Medical): No     Lack of Transportation (Non-Medical): No      Food Insecurity: No Food Insecurity (2025)    Hunger Vital Sign     Worried About Running Out of Food in the Last Year: Never true     Ran Out of Food in the Last Year: Never true     Financial Resource Strain: Medium Risk (2023)    Overall Financial Resource Strain (CARDIA)     Difficulty of Paying Living Expenses: Somewhat hard     Housing Stability: Low Risk  (2025)    Housing Stability Vital Sign     Unable to Pay for

## 2025-02-23 NOTE — PROGRESS NOTES
No chief complaint on file.      Assessment & Plan  Essential hypertension   {A/P Summary:1411748448}         Needs flu shot   {A/P Summary:9124332332::\"***\"}         Need for prophylactic vaccination against diphtheria-tetanus-pertussis (DTP)   {A/P Summary:3950411290::\"***\"}                Assessment & Plan      2 mos for blood pressure, prediabetes, polycythemia, cholesterol, elevated LFTs, lab results    Subjective:     History of Present Illness      Hypertension-  Symptoms:  ***  BP readings at home are ***  Comorbid:    Current treatment:  as below  Hypertension Medications       Thiazides and Thiazide-Like Diuretics       chlorthalidone (HYGROTON) 25 MG tablet TAKE 1 TABLET BY MOUTH DAILY       Angiotensin II Receptor Antagonists       losartan (COZAAR) 50 MG tablet Take 1 tablet by mouth daily           BP Readings from Last 3 Encounters:   11/21/24 (!) 152/96   05/19/23 126/87   02/08/23 (!) 161/109        Health Maintenance reviewed -   Health Maintenance Due   Topic    DTaP/Tdap/Td vaccine (6 - Tdap)    Varicella vaccine (1 of 2 - 13+ 2-dose series)    HIV screen     Flu vaccine (1)    COVID-19 Vaccine (3 - 2024-25 season)          Objective:     There were no vitals taken for this visit.    Results         Physical Exam        MIC Rivas

## 2025-02-24 ENCOUNTER — OFFICE VISIT (OUTPATIENT)
Facility: CLINIC | Age: 35
End: 2025-02-24

## 2025-02-24 VITALS
SYSTOLIC BLOOD PRESSURE: 120 MMHG | RESPIRATION RATE: 17 BRPM | BODY MASS INDEX: 37.86 KG/M2 | HEIGHT: 74 IN | DIASTOLIC BLOOD PRESSURE: 84 MMHG | TEMPERATURE: 98.5 F | WEIGHT: 295 LBS | HEART RATE: 111 BPM | OXYGEN SATURATION: 96 %

## 2025-02-24 DIAGNOSIS — Z23 NEED FOR PROPHYLACTIC VACCINATION AGAINST DIPHTHERIA-TETANUS-PERTUSSIS (DTP): ICD-10-CM

## 2025-02-24 DIAGNOSIS — Z23 NEEDS FLU SHOT: ICD-10-CM

## 2025-02-24 DIAGNOSIS — I10 ESSENTIAL HYPERTENSION: Primary | ICD-10-CM

## 2025-02-24 ASSESSMENT — ENCOUNTER SYMPTOMS: SHORTNESS OF BREATH: 0

## 2025-02-24 NOTE — PROGRESS NOTES
Arturo Blakely is a 34 y.o. male (: 1990) presenting to address:    Chief Complaint   Patient presents with    Hypertension     Patient declines any vaccines today.       Vitals:    25 1522   BP: (!) 125/92   Pulse: (!) 111   Resp: 17   Temp: 98.5 °F (36.9 °C)   SpO2: 96%       Coordination of Care Questionaire:     \"Have you been to the ER, urgent care clinic since your last visit?  Hospitalized since your last visit?\"    No     “Have you seen or consulted any other health care providers outside our system since your last visit?”    No

## 2025-02-24 NOTE — PROGRESS NOTES
Chief Complaint   Patient presents with    Hypertension     Patient declines any vaccines today.     Assessment & Plan:     1. Essential hypertension  Assessment & Plan:  BP acceptable, goal <130/80  Continue Losartan 50 mg daily, chlorthalidone 25 mg daily  2. Needs flu shot  Comments:  Patient to get from pharmacy, will bring records  3. Need for prophylactic vaccination against diphtheria-tetanus-pertussis (DTP)  Comments:  Already received, will check VIIS    Follow-up and Dispositions    Return in about 2 months (around 4/24/2025) for mos for blood pressure, prediabetes, polycythemia, cholesterol, elevated LFTs, lab results.       Subjective:     HPI    Hypertension-  Symptoms:  none  BP readings at home are are not being checked but when he was at the dentist on 02/13/2025 and it was 107/78  Comorbid: morbid obesity  Current treatment:  as below  Hypertension Medications       Thiazides and Thiazide-Like Diuretics       chlorthalidone (HYGROTON) 25 MG tablet TAKE 1 TABLET BY MOUTH DAILY       Angiotensin II Receptor Antagonists       losartan (COZAAR) 50 MG tablet Take 1 tablet by mouth daily           BP Readings from Last 3 Encounters:   02/24/25 120/84   11/21/24 (!) 152/96   05/19/23 126/87        Health Maintenance Due   Topic    DTaP/Tdap/Td vaccine (6 - Tdap) - recommended    Varicella vaccine (1 of 2 - 13+ 2-dose series) - recommended    HIV screen - recommended    Depression Screen - done today    Flu vaccine (1) - recommended    COVID-19 Vaccine (3 - 2023-24 season) recommended      Review of Systems   Constitutional: Negative.    Respiratory:  Negative for shortness of breath.    Cardiovascular:  Negative for chest pain, palpitations and leg swelling.   Neurological:  Negative for dizziness and headaches.     Objective:   /84   Pulse (!) 111   Temp 98.5 °F (36.9 °C) (Temporal)   Resp 17   Ht 1.88 m (6' 2\")   Wt 133.8 kg (295 lb)   SpO2 96%   BMI 37.88 kg/m²     Physical Exam  Vitals and

## 2025-04-10 DIAGNOSIS — I10 UNCONTROLLED HYPERTENSION: ICD-10-CM

## 2025-04-10 RX ORDER — CHLORTHALIDONE 25 MG/1
25 TABLET ORAL DAILY
Qty: 90 TABLET | Refills: 0 | Status: SHIPPED | OUTPATIENT
Start: 2025-04-10

## 2025-04-24 DIAGNOSIS — I10 UNCONTROLLED HYPERTENSION: ICD-10-CM

## 2025-04-24 RX ORDER — LOSARTAN POTASSIUM 50 MG/1
50 TABLET ORAL DAILY
Qty: 90 TABLET | Refills: 0 | Status: SHIPPED | OUTPATIENT
Start: 2025-04-24

## 2025-04-26 NOTE — ASSESSMENT & PLAN NOTE
A1c 5.7 on 12/3/2024  Overdue for repeat labs, advised to complete  Continue lifestyle modifications

## 2025-04-26 NOTE — ASSESSMENT & PLAN NOTE
LDL 64 on 12/3/2024, at goal  Continue lifestyle modifications  Overdue for repeat labs, advised to complete

## 2025-04-28 ENCOUNTER — OFFICE VISIT (OUTPATIENT)
Facility: CLINIC | Age: 35
End: 2025-04-28
Payer: COMMERCIAL

## 2025-04-28 VITALS
RESPIRATION RATE: 16 BRPM | BODY MASS INDEX: 37.27 KG/M2 | OXYGEN SATURATION: 100 % | HEART RATE: 114 BPM | SYSTOLIC BLOOD PRESSURE: 116 MMHG | TEMPERATURE: 98.1 F | DIASTOLIC BLOOD PRESSURE: 87 MMHG | WEIGHT: 290.4 LBS | HEIGHT: 74 IN

## 2025-04-28 DIAGNOSIS — I10 ESSENTIAL HYPERTENSION: Primary | ICD-10-CM

## 2025-04-28 DIAGNOSIS — E78.5 DYSLIPIDEMIA, GOAL LDL BELOW 100: ICD-10-CM

## 2025-04-28 DIAGNOSIS — R73.03 PREDIABETES: ICD-10-CM

## 2025-04-28 DIAGNOSIS — D75.1 POLYCYTHEMIA: ICD-10-CM

## 2025-04-28 DIAGNOSIS — R79.89 ELEVATED LFTS: ICD-10-CM

## 2025-04-28 DIAGNOSIS — Z23 NEED FOR PROPHYLACTIC VACCINATION AGAINST DIPHTHERIA-TETANUS-PERTUSSIS (DTP): ICD-10-CM

## 2025-04-28 PROCEDURE — 99214 OFFICE O/P EST MOD 30 MIN: CPT | Performed by: NURSE PRACTITIONER

## 2025-04-28 PROCEDURE — 3074F SYST BP LT 130 MM HG: CPT | Performed by: NURSE PRACTITIONER

## 2025-04-28 PROCEDURE — 3079F DIAST BP 80-89 MM HG: CPT | Performed by: NURSE PRACTITIONER

## 2025-04-28 RX ORDER — METHYLPHENIDATE HYDROCHLORIDE 20 MG/1
40 CAPSULE, EXTENDED RELEASE ORAL EVERY MORNING
COMMUNITY
Start: 2025-03-22 | End: 2025-04-28 | Stop reason: ALTCHOICE

## 2025-04-28 RX ORDER — METHYLPHENIDATE HYDROCHLORIDE EXTENDED RELEASE 20 MG/1
20 TABLET ORAL EVERY MORNING
COMMUNITY

## 2025-04-28 ASSESSMENT — PATIENT HEALTH QUESTIONNAIRE - PHQ9
2. FEELING DOWN, DEPRESSED OR HOPELESS: NOT AT ALL
SUM OF ALL RESPONSES TO PHQ QUESTIONS 1-9: 0
1. LITTLE INTEREST OR PLEASURE IN DOING THINGS: NOT AT ALL
SUM OF ALL RESPONSES TO PHQ QUESTIONS 1-9: 0

## 2025-04-28 NOTE — PROGRESS NOTES
Chief Complaint   Patient presents with    Hypertension    prediabetes    Cholesterol Problem    Elevated Hepatic Enzymes    Polycythemia     Assessment & Plan:     Assessment & Plan  Essential hypertension  BP acceptable, goal <130/80  Continue Losartan 50 mg daily, chlorthalidone 25 mg daily         Prediabetes  A1c 5.7 on 12/3/2024  Overdue for repeat labs, advised to complete  Continue lifestyle modifications         Dyslipidemia, goal LDL below 100  LDL 64 on 12/3/2024, at goal  Continue lifestyle modifications  Overdue for repeat labs, advised to complete         Elevated LFTs  ALT 64 on 12/3/2024  Overdue for repeat labs, advised to complete           Polycythemia  H/H 16/52 on 12/3/2024  Overdue for repeat labs, advised to complete         Need for prophylactic vaccination against diphtheria-tetanus-pertussis (DTP)  Declined tdap           Follow-up and Dispositions    Return in about 2 weeks (around 5/12/2025) for  blood pressure, cholesterol, elevated LFTs, lab results, (virtually).       Subjective:     History of Present Illness  The patient is a 35-year-old male who presents for follow-up of multiple health issues including hypertension, prediabetes, dyslipidemia, elevated liver enzymes, and polycythemia.     Hypertension  - Home BP readings are normal.  - Consistent medication adherence, except for two missed doses causing decline in well-being.  - Evening medication schedule with allergy medication has been beneficial.  - Current regimen: losartan 50 mg daily, chlorthalidone 25 mg daily.    Prediabetes  - No frequent urination or excessive thirst.    Dyslipidemia  - No abdominal discomfort, nausea, or vomiting.    Elevated liver enzymes  - No abdominal discomfort, nausea, or vomiting.    Polycythemia  - Did not complete fasting labs.    Weight  - Weight decreased by 14 pounds.  - No dizziness.    Attention Deficit Disorder (ADD)  - Ritalin 20 mg extended release daily.    Supplemental information: No 
seen or consulted any other health care providers outside of [facility] since your last visit?”    NO          Click Here for Release of Records Request

## 2025-04-29 ENCOUNTER — RESULTS FOLLOW-UP (OUTPATIENT)
Facility: CLINIC | Age: 35
End: 2025-04-29

## 2025-04-29 LAB
ALBUMIN SERPL-MCNC: 4.9 G/DL (ref 4.1–5.1)
ALP SERPL-CCNC: 116 IU/L (ref 44–121)
ALT SERPL-CCNC: 52 IU/L (ref 0–44)
AST SERPL-CCNC: 30 IU/L (ref 0–40)
BASOPHILS # BLD AUTO: 0.1 X10E3/UL (ref 0–0.2)
BASOPHILS NFR BLD AUTO: 1 %
BILIRUB SERPL-MCNC: 0.7 MG/DL (ref 0–1.2)
BUN SERPL-MCNC: 13 MG/DL (ref 6–20)
BUN/CREAT SERPL: 12 (ref 9–20)
CALCIUM SERPL-MCNC: 9.6 MG/DL (ref 8.7–10.2)
CHLORIDE SERPL-SCNC: 99 MMOL/L (ref 96–106)
CHOLEST SERPL-MCNC: 164 MG/DL (ref 100–199)
CO2 SERPL-SCNC: 25 MMOL/L (ref 20–29)
CREAT SERPL-MCNC: 1.13 MG/DL (ref 0.76–1.27)
EGFRCR SERPLBLD CKD-EPI 2021: 86 ML/MIN/1.73
EOSINOPHIL # BLD AUTO: 0.4 X10E3/UL (ref 0–0.4)
EOSINOPHIL NFR BLD AUTO: 4 %
ERYTHROCYTE [DISTWIDTH] IN BLOOD BY AUTOMATED COUNT: 13.4 % (ref 11.6–15.4)
EST. AVERAGE GLUCOSE BLD GHB EST-MCNC: 120 MG/DL
GLOBULIN SER CALC-MCNC: 3 G/DL (ref 1.5–4.5)
GLUCOSE SERPL-MCNC: 90 MG/DL (ref 70–99)
HBA1C MFR BLD: 5.8 % (ref 4.8–5.6)
HCT VFR BLD AUTO: 47.2 % (ref 37.5–51)
HDLC SERPL-MCNC: 38 MG/DL
HGB BLD-MCNC: 15.6 G/DL (ref 13–17.7)
IMM GRANULOCYTES # BLD AUTO: 0.1 X10E3/UL (ref 0–0.1)
IMM GRANULOCYTES NFR BLD AUTO: 1 %
LDLC SERPL CALC-MCNC: 102 MG/DL (ref 0–99)
LYMPHOCYTES # BLD AUTO: 2.5 X10E3/UL (ref 0.7–3.1)
LYMPHOCYTES NFR BLD AUTO: 25 %
MCH RBC QN AUTO: 28.5 PG (ref 26.6–33)
MCHC RBC AUTO-ENTMCNC: 33.1 G/DL (ref 31.5–35.7)
MCV RBC AUTO: 86 FL (ref 79–97)
MONOCYTES # BLD AUTO: 0.7 X10E3/UL (ref 0.1–0.9)
MONOCYTES NFR BLD AUTO: 7 %
NEUTROPHILS # BLD AUTO: 6.3 X10E3/UL (ref 1.4–7)
NEUTROPHILS NFR BLD AUTO: 62 %
PLATELET # BLD AUTO: 270 X10E3/UL (ref 150–450)
POTASSIUM SERPL-SCNC: 4.1 MMOL/L (ref 3.5–5.2)
PROT SERPL-MCNC: 7.9 G/DL (ref 6–8.5)
RBC # BLD AUTO: 5.48 X10E6/UL (ref 4.14–5.8)
SODIUM SERPL-SCNC: 139 MMOL/L (ref 134–144)
TRIGL SERPL-MCNC: 137 MG/DL (ref 0–149)
VLDLC SERPL CALC-MCNC: 24 MG/DL (ref 5–40)
WBC # BLD AUTO: 10 X10E3/UL (ref 3.4–10.8)

## 2025-05-17 NOTE — ASSESSMENT & PLAN NOTE
Improving, continue to trend  Advised on ETOH/Tylenol use  Recheck cmp in 3 mos    Orders:    Comprehensive Metabolic Panel; Future

## 2025-05-17 NOTE — ASSESSMENT & PLAN NOTE
BP goal <130/80  Continue Losartan 50 mg, chlorthalidone 25 mg daily    Orders:    Comprehensive Metabolic Panel; Future

## 2025-05-17 NOTE — ASSESSMENT & PLAN NOTE
LDL slightly elevated at 102  Continue risk factor modifications  Recheck lipids in 6 mos    Orders:    Comprehensive Metabolic Panel; Future    Lipid Panel; Future

## 2025-05-17 NOTE — PROGRESS NOTES
Arturo Blakely 35 y.o. 35 y.o. who was seen by synchronous (real-time) audio-video technology on 5/19/25 for   Chief Complaint   Patient presents with    Cholesterol Problem    Hypertension    Discuss Labs       Assessment & Plan:     Assessment & Plan  Essential hypertension  BP goal <130/80  Continue Losartan 50 mg, chlorthalidone 25 mg daily    Orders:    Comprehensive Metabolic Panel; Future    Dyslipidemia, goal LDL below 100  LDL slightly elevated at 102  Continue risk factor modifications  Recheck lipids in 6 mos    Orders:    Comprehensive Metabolic Panel; Future    Lipid Panel; Future    Elevated LFTs  Improving, continue to trend  Advised on ETOH/Tylenol use  Recheck cmp in 3 mos    Orders:    Comprehensive Metabolic Panel; Future    Polycythemia  Resolved         Prediabetes  A1c worsening, up from 5.7 to 5.8  Continue risk factor modifications  Recheck A1c in 6 mos    Orders:    Comprehensive Metabolic Panel; Future    Hemoglobin A1C; Future    Encounter to discuss test results  Discussed 04/28/2025 fasting labs           Follow-up and Dispositions    Return in about 6 months (around 11/19/2025) for PHYSICAL, lab results.         Subjective:     History of Present Illness  The patient is a 35-year-old male with a history of hypertension, dyslipidemia, elevated liver enzymes, polycythemia, prediabetes, and recent fasting lab results from 04/28/2025.    Hypertension  - Reports satisfactory BP readings  - Currently on losartan 50 mg and chlorthalidone 25 mg    Prediabetes  - Did not strictly adhere to fasting requirements before A1c test  - Consumed coffee with creamer 4 hours prior    Physical Activity  - Efforts to incorporate physical activity    Liver Enzymes  - Liver enzymes improved since 12/2024    Polycythemia  - Erythrocytosis normalized    Supplemental information: Advised to monitor diet, reduce saturated fats and sugary foods, increase physical activity.      Objective:     There were no vitals

## 2025-05-17 NOTE — ASSESSMENT & PLAN NOTE
A1c worsening, up from 5.7 to 5.8  Continue risk factor modifications  Recheck A1c in 6 mos    Orders:    Comprehensive Metabolic Panel; Future    Hemoglobin A1C; Future

## 2025-05-19 ENCOUNTER — TELEMEDICINE (OUTPATIENT)
Facility: CLINIC | Age: 35
End: 2025-05-19
Payer: COMMERCIAL

## 2025-05-19 DIAGNOSIS — D75.1 POLYCYTHEMIA: ICD-10-CM

## 2025-05-19 DIAGNOSIS — I10 ESSENTIAL HYPERTENSION: Primary | ICD-10-CM

## 2025-05-19 DIAGNOSIS — R79.89 ELEVATED LFTS: ICD-10-CM

## 2025-05-19 DIAGNOSIS — R73.03 PREDIABETES: ICD-10-CM

## 2025-05-19 DIAGNOSIS — E78.5 DYSLIPIDEMIA, GOAL LDL BELOW 100: ICD-10-CM

## 2025-05-19 DIAGNOSIS — Z71.2 ENCOUNTER TO DISCUSS TEST RESULTS: ICD-10-CM

## 2025-05-19 PROCEDURE — 99214 OFFICE O/P EST MOD 30 MIN: CPT | Performed by: NURSE PRACTITIONER

## 2025-05-19 ASSESSMENT — PATIENT HEALTH QUESTIONNAIRE - PHQ9
2. FEELING DOWN, DEPRESSED OR HOPELESS: NOT AT ALL
SUM OF ALL RESPONSES TO PHQ QUESTIONS 1-9: 0
SUM OF ALL RESPONSES TO PHQ QUESTIONS 1-9: 0
1. LITTLE INTEREST OR PLEASURE IN DOING THINGS: NOT AT ALL
SUM OF ALL RESPONSES TO PHQ QUESTIONS 1-9: 0
SUM OF ALL RESPONSES TO PHQ QUESTIONS 1-9: 0

## 2025-05-19 NOTE — PROGRESS NOTES
Arturo Blakely presents today for No chief complaint on file.        Is the patient in the [facility] ? yes    Is someone accompanying this pt? N/a    Is the patient using any DME equipment during OV? N/a    Depression Screenin/28/2025     2:39 PM 2025    10:21 AM 2023     9:40 AM 2023     2:10 PM 2023     2:09 PM 2023     2:38 PM   PHQ-9 Questionaire   Little interest or pleasure in doing things 0 0 0 0 0 0   Feeling down, depressed, or hopeless 0 0 0 0 0 0   PHQ-9 Total Score 0 0 0 0 0 0        EMILEE 7-Anxiety        No data to display                   Learning Assessment:  No question data found.     Fall Risk       No data to display                   Travel Screening:    Travel Screening     No screening recorded since 25 0000       Travel History   Travel since 25    No documented travel since 25          Health Maintenance reviewed and discussed and ordered per Provider.  Transportation Needs: No Transportation Needs (2025)    PRAPARE - Transportation     Lack of Transportation (Medical): No     Lack of Transportation (Non-Medical): No      Food Insecurity: No Food Insecurity (2025)    Hunger Vital Sign     Worried About Running Out of Food in the Last Year: Never true     Ran Out of Food in the Last Year: Never true     Financial Resource Strain: Medium Risk (2023)    Overall Financial Resource Strain (CARDIA)     Difficulty of Paying Living Expenses: Somewhat hard     Housing Stability: Low Risk  (2025)    Housing Stability Vital Sign     Unable to Pay for Housing in the Last Year: No     Number of Times Moved in the Last Year: 0     Homeless in the Last Year: No       Did you provide resources if patient requested them? yes      Health Maintenance Due   Topic Date Due    Varicella vaccine (1 of 2 - 13+ 2-dose series) Never done    HIV screen  Never done    COVID-19 Vaccine (3 - 2024-25 season) 2024   .      \"Have you been to the

## 2025-07-13 DIAGNOSIS — I10 UNCONTROLLED HYPERTENSION: ICD-10-CM

## 2025-07-14 RX ORDER — CHLORTHALIDONE 25 MG/1
25 TABLET ORAL DAILY
Qty: 90 TABLET | Refills: 1 | Status: SHIPPED | OUTPATIENT
Start: 2025-07-14

## 2025-07-14 NOTE — TELEPHONE ENCOUNTER
Labs:   Lab Results   Component Value Date     04/28/2025    K 4.1 04/28/2025    CL 99 04/28/2025    CO2 25 04/28/2025    BUN 13 04/28/2025    CREATININE 1.13 04/28/2025    GLUCOSE 90 04/28/2025    CALCIUM 9.6 04/28/2025    BILITOT 0.7 04/28/2025    ALKPHOS 116 04/28/2025    AST 30 04/28/2025    ALT 52 (H) 04/28/2025    LABGLOM 86 04/28/2025    GLOB 3.7 12/03/2024        Additional Notes:

## 2025-07-22 DIAGNOSIS — I10 UNCONTROLLED HYPERTENSION: ICD-10-CM

## 2025-07-22 RX ORDER — LOSARTAN POTASSIUM 50 MG/1
50 TABLET ORAL DAILY
Qty: 90 TABLET | Refills: 1 | Status: SHIPPED | OUTPATIENT
Start: 2025-07-22